# Patient Record
Sex: MALE | Race: WHITE | NOT HISPANIC OR LATINO
[De-identification: names, ages, dates, MRNs, and addresses within clinical notes are randomized per-mention and may not be internally consistent; named-entity substitution may affect disease eponyms.]

---
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
## 2024-08-14 ENCOUNTER — HOSPITAL ENCOUNTER (EMERGENCY)
Age: 24
Discharge: HOME | End: 2024-08-14
Attending: EMERGENCY MEDICINE
Payer: COMMERCIAL

## 2024-08-14 VITALS
HEIGHT: 78 IN | WEIGHT: 143 LBS | BODY MASS INDEX: 16.54 KG/M2 | DIASTOLIC BLOOD PRESSURE: 63 MMHG | TEMPERATURE: 100 F | OXYGEN SATURATION: 97 % | HEART RATE: 97 BPM | SYSTOLIC BLOOD PRESSURE: 114 MMHG | RESPIRATION RATE: 16 BRPM

## 2024-08-14 DIAGNOSIS — R11.2 NAUSEA AND VOMITING IN ADULT: ICD-10-CM

## 2024-08-14 DIAGNOSIS — K29.70 VIRAL GASTRITIS: Primary | ICD-10-CM

## 2024-08-14 LAB
ALBUMIN SERPL-MCNC: 4.4 G/DL (ref 3.4–5)
ALBUMIN/GLOB SERPL: 1.6 {RATIO} (ref 1–2)
ALP LIVER SERPL-CCNC: 47 U/L
ALT SERPL-CCNC: 30 U/L
ANION GAP SERPL CALC-SCNC: 13 MMOL/L (ref 0–18)
AST SERPL-CCNC: 16 U/L (ref 15–37)
BASOPHILS # BLD AUTO: 0.06 X10(3) UL (ref 0–0.2)
BASOPHILS NFR BLD AUTO: 0.4 %
BILIRUB SERPL-MCNC: 3.2 MG/DL (ref 0.1–2)
BUN BLD-MCNC: 24 MG/DL (ref 9–23)
CALCIUM BLD-MCNC: 9.1 MG/DL (ref 8.5–10.1)
CHLORIDE SERPL-SCNC: 105 MMOL/L (ref 98–112)
CO2 SERPL-SCNC: 21 MMOL/L (ref 21–32)
CREAT BLD-MCNC: 0.96 MG/DL
EGFRCR SERPLBLD CKD-EPI 2021: 114 ML/MIN/1.73M2 (ref 60–?)
EOSINOPHIL # BLD AUTO: 0.05 X10(3) UL (ref 0–0.7)
EOSINOPHIL NFR BLD AUTO: 0.4 %
ERYTHROCYTE [DISTWIDTH] IN BLOOD BY AUTOMATED COUNT: 18.9 %
GLOBULIN PLAS-MCNC: 2.7 G/DL (ref 2.8–4.4)
GLUCOSE BLD-MCNC: 136 MG/DL (ref 70–99)
HCT VFR BLD AUTO: 42.4 %
HGB BLD-MCNC: 13.8 G/DL
IMM GRANULOCYTES # BLD AUTO: 0.07 X10(3) UL (ref 0–1)
IMM GRANULOCYTES NFR BLD: 0.5 %
LIPASE SERPL-CCNC: 32 U/L (ref 12–53)
LYMPHOCYTES # BLD AUTO: 0.45 X10(3) UL (ref 1–4)
LYMPHOCYTES NFR BLD AUTO: 3.3 %
MCH RBC QN AUTO: 19 PG (ref 26–34)
MCHC RBC AUTO-ENTMCNC: 32.5 G/DL (ref 31–37)
MCV RBC AUTO: 58.5 FL
MONOCYTES # BLD AUTO: 0.87 X10(3) UL (ref 0.1–1)
MONOCYTES NFR BLD AUTO: 6.4 %
NEUTROPHILS # BLD AUTO: 12.04 X10 (3) UL (ref 1.5–7.7)
NEUTROPHILS # BLD AUTO: 12.04 X10(3) UL (ref 1.5–7.7)
NEUTROPHILS NFR BLD AUTO: 89 %
OSMOLALITY SERPL CALC.SUM OF ELEC: 294 MOSM/KG (ref 275–295)
PLATELET # BLD AUTO: 273 10(3)UL (ref 150–450)
PLATELETS.RETICULATED NFR BLD AUTO: 2.4 % (ref 0–7)
POTASSIUM SERPL-SCNC: 3.4 MMOL/L (ref 3.5–5.1)
PROT SERPL-MCNC: 7.1 G/DL (ref 6.4–8.2)
RBC # BLD AUTO: 7.25 X10(6)UL
SARS-COV-2 RNA RESP QL NAA+PROBE: NOT DETECTED
SODIUM SERPL-SCNC: 139 MMOL/L (ref 136–145)
WBC # BLD AUTO: 13.5 X10(3) UL (ref 4–11)

## 2024-08-14 PROCEDURE — 80053 COMPREHEN METABOLIC PANEL: CPT | Performed by: EMERGENCY MEDICINE

## 2024-08-14 PROCEDURE — 96361 HYDRATE IV INFUSION ADD-ON: CPT

## 2024-08-14 PROCEDURE — 99284 EMERGENCY DEPT VISIT MOD MDM: CPT

## 2024-08-14 PROCEDURE — 96375 TX/PRO/DX INJ NEW DRUG ADDON: CPT

## 2024-08-14 PROCEDURE — 83690 ASSAY OF LIPASE: CPT | Performed by: PHYSICIAN ASSISTANT

## 2024-08-14 PROCEDURE — 96374 THER/PROPH/DIAG INJ IV PUSH: CPT

## 2024-08-14 PROCEDURE — 87430 STREP A AG IA: CPT | Performed by: PHYSICIAN ASSISTANT

## 2024-08-14 PROCEDURE — 85025 COMPLETE CBC W/AUTO DIFF WBC: CPT | Performed by: EMERGENCY MEDICINE

## 2024-08-14 PROCEDURE — S0119 ONDANSETRON 4 MG: HCPCS | Performed by: PHYSICIAN ASSISTANT

## 2024-08-14 RX ORDER — ONDANSETRON 4 MG/1
4 TABLET, ORALLY DISINTEGRATING ORAL EVERY 4 HOURS PRN
Qty: 10 TABLET | Refills: 0 | Status: SHIPPED | OUTPATIENT
Start: 2024-08-14 | End: 2024-08-21

## 2024-08-14 RX ORDER — ONDANSETRON 2 MG/ML
4 INJECTION INTRAMUSCULAR; INTRAVENOUS ONCE
Status: COMPLETED | OUTPATIENT
Start: 2024-08-14 | End: 2024-08-14

## 2024-08-14 RX ORDER — ONDANSETRON 4 MG/1
4 TABLET, ORALLY DISINTEGRATING ORAL ONCE
Status: COMPLETED | OUTPATIENT
Start: 2024-08-14 | End: 2024-08-14

## 2024-08-14 RX ORDER — KETOROLAC TROMETHAMINE 15 MG/ML
15 INJECTION, SOLUTION INTRAMUSCULAR; INTRAVENOUS ONCE
Status: COMPLETED | OUTPATIENT
Start: 2024-08-14 | End: 2024-08-14

## 2024-08-15 NOTE — ED PROVIDER NOTES
Patient Seen in: [facility] In [place]      History     Chief Complaint   Patient presents with    Nausea/Vomiting/Diarrhea     Stated Complaint: vomiting since 1900    Subjective:   HPI    24-year-old male.  Medical history of umbilical hernia repair at 6 years old.  No other medical history.  No regular medications.  While driving home from work, patient noted a subtle upset stomach.  He went home and took a nap.  He awoke from the nap and immediately began to vomit.  He has had nonstop vomiting since that time.  Upon arrival to the ER, single isolated episode of diarrhea.  Normal recent health.  No recent travel or antibiotics.  No history of similar occurrences.  He is suspicious he might have food poisoning from his lunch.    Objective:   History reviewed. No pertinent past medical history.           Past Surgical History:   Procedure Laterality Date    Repair ing hernia,6+y/o,reducibl                  Social History     Socioeconomic History    Marital status: Single   Tobacco Use    Smoking status: Never    Smokeless tobacco: Never   Vaping Use    Vaping status: Never Used   Substance and Sexual Activity    Alcohol use: Never    Drug use: Never              Review of Systems    Positive for stated Chief Complaint: Nausea/Vomiting/Diarrhea    Other systems are as noted in HPI.  Constitutional and vital signs reviewed.      All other systems reviewed and negative except as noted above.    Physical Exam     ED Triage Vitals [08/14/24 2123]   /78   Pulse 111   Resp 16   Temp (!) 100.7 °F (38.2 °C)   Temp src Oral   SpO2 100 %   O2 Device None (Room air)       Current Vitals:   Vital Signs  BP: 119/69  Pulse: 92  Resp: 15  Temp: 99.9 °F (37.7 °C)  Temp src: Oral    Oxygen Therapy  SpO2: 97 %  O2 Device: None (Room air)            Physical Exam    Gen: Well appearing, well groomed, alert and aware x 3  Neck: Supple, full range of motion, no thyromegaly or lymphadenopathy.  Abdominal:  Soft exam without distention.  No pain to palpation all 4 quadrants. No organomegaly.  Normal bowel sounds.  Back: Full active range of motion.  No CVA tenderness bilaterally.  Lung: No distress, RR, no retraction, breath sounds are clear bilaterally  Cardio: Regular rate and rhythm, normal S1-S2, no murmur appreciable    ED Course     Labs Reviewed   COMP METABOLIC PANEL (14) - Abnormal; Notable for the following components:       Result Value    Glucose 136 (*)     Potassium 3.4 (*)     BUN 24 (*)     Bilirubin, Total 3.2 (*)     Globulin  2.7 (*)     All other components within normal limits   CBC WITH DIFFERENTIAL WITH PLATELET - Abnormal; Notable for the following components:    WBC 13.5 (*)     RBC 7.25 (*)     MCV 58.5 (*)     MCH 19.0 (*)     Neutrophil Absolute Prelim 12.04 (*)     Neutrophil Absolute 12.04 (*)     Lymphocyte Absolute 0.45 (*)     All other components within normal limits   LIPASE - Normal   RAPID SARS-COV-2 BY PCR - Normal   RAPID STREP A SCREEN (LC) - Normal    Narrative:     A confirmatory culture is recommended if clinically indicated.   PATH COMMENT CBC                      MDM          Patient actively dry heaving in room.  IV line placed.  Fluid bolus.  Zofran.  He arrives with a temperature 100.7.  He was unaware of the fever.  No recent cough or cold symptoms.  Denies sore throat.  Benign abdominal exam        COVID and strep obtained.  CBC, CMP and lipase.  Fluid bolus.      CBC demonstrates mild leukocytosis with a white count of 13.5.    Glucose 136, potassium 3.4    Lipase normal    Strep negative    COVID-negative           Patient reevaluated.  Temperature now 99.9.  Pulse 92.  Feels much improved.  No further episodes of emesis.  P.o. challenge performed and successful     Sent home with further Zofran.  [place] diet.  Instructions to return for worsening         Medical Decision Making      Disposition and Plan     Clinical Impression:  1. Viral gastritis    2. Nausea and  vomiting in adult         Disposition:  Discharge  8/14/2024 11:04 pm    Follow-up:  No follow-up provider specified.        Medications Prescribed:  Current Discharge Medication List        START taking these medications    Details   ondansetron 4 MG Oral Tablet Dispersible Take 1 tablet (4 mg total) by mouth every 4 (four) hours as needed for Nausea.  Qty: 10 tablet, Refills: 0

## 2024-08-15 NOTE — ED INITIAL ASSESSMENT (HPI)
Vomiting since 1900. Denies diarrhea or fever. Dull abdominal pain while driving home from work.